# Patient Record
Sex: MALE | Race: BLACK OR AFRICAN AMERICAN
[De-identification: names, ages, dates, MRNs, and addresses within clinical notes are randomized per-mention and may not be internally consistent; named-entity substitution may affect disease eponyms.]

---

## 2017-03-27 NOTE — EDM.PDOC
ED HPI GENERAL MEDICAL PROBLEM





- General


Chief Complaint: General


Stated Complaint: BLEEDING MOUTH


Time Seen by Provider: 03/27/17 21:10


Source of Information: Reports: Patient


History Limitations: Reports: No limitations





- History of Present Illness


INITIAL COMMENTS - FREE TEXT/NARRATIVE: 





History of present illness:


[27-year-old male comes in complaining with acute oral pain indicates that he 

has had 2 stents fractured and his mouth is bleeding. Tooth is an area of 18-19 

and is able to store tooth that has now fractured and you can see the amalgam 

as well as the Bentson and some beginning caries.]





Review of systems: 


As per history of present illness and below otherwise all systems reviewed and 

negative.





Past medical history: 


As per history of present illness and as reviewed below otherwise 

noncontributory.





Surgical history: 


As per history of present illness and as reviewed below otherwise 

noncontributory.





Social history: 


No reported history of drug or alcohol abuse.





Family history: 


As per history of present illness and as reviewed below otherwise 

noncontributory.





Physical exam:


HEENT: Atraumatic, normocephalic, pupils reactive, negative for conjunctival 

pallor or scleral icterus, mucous membranes moist, throat clear, neck supple, 

nontender, trachea midline.


Lungs: Clear to auscultation, breath sounds equal bilaterally, chest nontender.


Heart: S1S2, regular, negative for clicks, rubs, or JVD.


Abdomen: Soft, nondistended, nontender. Negative for masses or 

hepatosplenomegaly. Negative for costovertebral tenderness.


Pelvis: Stable nontender.


Genitourinary: Deferred.


Rectal: Deferred.


Extremities: Atraumatic, negative for cords or calf pain. Neurovascular 

unremarkable.


Neuro: Awake, alert, oriented. Cranial nerves II through XII unremarkable. 

Cerebellum unremarkable. Motor and sensory unremarkable throughout. Exam 

nonfocal.





Fractured tooth with obvious piece of tooth missing with dental caries





Diagnostics:


[]





Therapeutics:


[]





Impression: 


[Oral pain]





Plan:


[Dental balls, antibiotics, brief run of Tylenol 3]





Definitive disposition and diagnosis as appropriate pending reevaluation and 

review of above.








  ** left lower jaw


Pain Score (Numeric/FACES): 8





- Related Data


 Allergies











Allergy/AdvReac Type Severity Reaction Status Date / Time


 


seafood Allergy  Airway Uncoded 03/27/17 21:08





   Tightness  











Home Meds: 


 Home Meds





Amoxicillin/Clavulanate K [Augmentin 875 MG/125 MG] 1 tab PO Q12HR #20 tablet 03 /27/17 [Rx]











Past Medical History





- Past Health History


Medical/Surgical History: Denies Medical/Surgical History


Other Cardiovascular History: childhood heart murmur





- Infectious Disease History


Infectious Disease History: Reports: Chicken pox





Social & Family History





- Family History


Family Medical History: Noncontributory





- Tobacco Use


Smoking Status *Q: Current Every Day Smoker


Years of Tobacco use: 3


Packs/Tins Daily: 0.5


Used Tobacco, but Quit: Yes


Month Tobacco Last Used: 11/01/2015





- Recreational Drug Use


Recreational Drug Use: Yes


Drug Use in Last 12 Months: Yes


Recreational Drug Type: Reports: Marijuana/Hashish


Recreational Drug Use Frequency: Binges





ED ROS GENERAL





- Review of Systems


Review Of Systems: See Below (The history of present illness)





ED EXAM, GENERAL





- Physical Exam


Exam: See Below (See history of present illness)





Course





- Vital Signs


Last Recorded V/S: 





 Last Vital Signs











Temp  36.4 C   03/27/17 21:08


 


Pulse  88   03/27/17 21:08


 


Resp  18   03/27/17 21:08


 


BP  134/74   03/27/17 21:08


 


Pulse Ox  98   03/27/17 21:08














- Orders/Labs/Meds


Meds: 





Medications














Discontinued Medications














Generic Name Dose Route Start Last Admin





  Trade Name Wong  PRN Reason Stop Dose Admin


 


Benzocaine  2 each  03/27/17 21:17  





  Hurricaine One 20%  MUCMEM  03/27/17 21:18  





  ONETIME ONE   


 


Lidocaine HCl  15 ml  03/27/17 21:17  





  Xylocaine 2% Viscous  PO  03/27/17 21:18  





  ONETIME ONE   














Departure





- Departure


Time of Disposition: 21:21


Disposition: Home, Self-Care 01


Condition: good


Clinical Impression: 


 Dental abscess, Dental caries, Fractured dental restoration with loss of 

material





Prescriptions: 


Amoxicillin/Clavulanate K [Augmentin 875 MG/125 MG] 1 tab PO Q12HR #20 tablet


Forms:  ED Department Discharge


Additional Instructions: 


The following information is given to patients seen in the emergency department 

who are being discharged to home. This information is to outline your options 

for follow-up care. We provide all patients seen in our emergency department 

with a follow-up referral.





The need for follow-up, as well as the timing and circumstances, are variable 

depending upon the specifics of your emergency department visit.





If you don't have a primary care physician on staff, we will provide you with a 

referral. We always advise you to contact your personal physician following an 

emergency department visit to inform them of the circumstance of the visit and 

for follow-up with them and/or the need for any referrals to a consulting 

specialist.





The emergency department will also refer you to a specialist when appropriate. 

This referral assures that you have the opportunity for follow-up care with a 

specialist. All of these measure are taken in an effort to provide you with 

optimal care, which includes your follow-up.





Under all circumstances we always encourage you to contact your private 

physician who remains a resource for coordinating your care. When calling for 

follow-up care, please make the office aware that this follow-up is from your 

recent emergency room visit. If for any reason you are refused follow-up, 

please contact the [facility] Emergency 

Department at [phone] and asked to speak to the emergency department 

charge nurse.





Take medication as directed








All the primary care provider/dentist ASAP











Return to ED as needed as discussed

## 2017-05-15 NOTE — EDM.PDOC
ED HPI GENERAL MEDICAL PROBLEM





- General


Chief Complaint: General


Stated Complaint: MOUTH


Time Seen by Provider: 05/15/17 08:38


Source of Information: Reports: Patient


History Limitations: Reports: No Limitations





- History of Present Illness


INITIAL COMMENTS - FREE TEXT/NARRATIVE: 


History of present illness:


[] Patient has history of a bad tooth it intermittently flares up with pain. 

Yesterday it got worse after eating something hard or he denies any fevers, 

chills or swelling of his face. He feels there is swelling of the surrounding 

gum but denies any drainage.





Review of systems: 


As per history of present illness and below otherwise all systems reviewed and 

negative.





Past medical history: 


As per history of present illness and as reviewed below otherwise 

noncontributory.





Surgical history: 


As per history of present illness and as reviewed below otherwise 

noncontributory.





Social history: 


No reported history of drug or alcohol abuse.





Family history: 


As per history of present illness and as reviewed below otherwise 

noncontributory.





Physical exam:


General: Well developed, well nourished in NAD


HEENT: Atraumatic, normocephalic, pupils reactive, negative for conjunctival 

pallor or scleral icterus, mucous membranes moist, throat clear, neck supple, 

nontender, trachea midline.


Lungs: Clear to auscultation, breath sounds equal bilaterally, chest nontender.


Heart: S1S2, regular, negative for clicks, rubs, or JVD.


Abdomen: Soft, nondistended, nontender. Negative for masses or 

hepatosplenomegaly. Negative for costovertebral tenderness.


Pelvis: Stable nontender.


Genitourinary: Deferred.


Rectal: Deferred.


Extremities: Atraumatic, negative for cords or calf pain. Neurovascular 

unremarkable.


Neuro: Awake, alert, oriented. Cranial nerves II through XII unremarkable. 

Cerebellum unremarkable. Motor and sensory unremarkable throughout. Exam 

nonfocal.





Diagnostics:


[]





Therapeutics:


[] I offered a dental block he refused because he is "deathly afraid of needles

". Patient given tramadol in the ED





Impression: 


[] Dental caries





Plan:


[] Lane K, tramadol, dental balls for pain followup with a dentist for tooth 

extraction ASAP





Definitive disposition and diagnosis as appropriate pending reevaluation and 

review of above.





  ** Left Lower Dental


Pain Score (Numeric/FACES): 7





- Related Data


 Allergies











Allergy/AdvReac Type Severity Reaction Status Date / Time


 


seafood Allergy  Airway Uncoded 05/15/17 08:50





   Tightness  











Home Meds: 


 Home Meds





Penicillin V Potassium 500 mg PO Q6HR #40 tab 05/15/17 [Rx]


traMADol [Ultram] 50 mg PO Q8H PRN #12 tablet 05/15/17 [Rx]











Past Medical History





- Past Health History


Medical/Surgical History: Denies Medical/Surgical History


Other Cardiovascular History: childhood heart murmur





- Infectious Disease History


Infectious Disease History: Reports: Chicken Pox





Social & Family History





- Family History


Family Medical History: Noncontributory





- Tobacco Use


Smoking Status *Q: Current Every Day Smoker


Years of Tobacco use: 3


Packs/Tins Daily: 0.5


Used Tobacco, but Quit: Yes


Month Tobacco Last Used: 11/01/2015





- Recreational Drug Use


Recreational Drug Use: Yes


Drug Use in Last 12 Months: Yes


Recreational Drug Type: Reports: Marijuana/Hashish


Recreational Drug Use Frequency: Binges





ED ROS GENERAL





- Review of Systems


Review Of Systems: See Below (See history of present illness)





ED EXAM, GENERAL





- Physical Exam


Exam: See Below (See history of present illness)





Course





- Vital Signs


Last Recorded V/S: 


 Last Vital Signs











Temp  36.7 C   05/15/17 08:50


 


Pulse  82   05/15/17 08:50


 


Resp  18   05/15/17 08:50


 


BP  196/103 H  05/15/17 08:50


 


Pulse Ox  97   05/15/17 08:50














- Orders/Labs/Meds


Meds: 


Medications














Discontinued Medications














Generic Name Dose Route Start Last Admin





  Trade Name Freq  PRN Reason Stop Dose Admin


 


Benzocaine  2 each  05/15/17 08:52  





  Hurricaine One 20%  MUCMEM  05/15/17 08:53  





  ONETIME ONE   


 


Lidocaine HCl  15 ml  05/15/17 08:52  





  Xylocaine 2% Viscous  PO  05/15/17 08:53  





  ONETIME ONE   


 


Tramadol HCl  50 mg  05/15/17 08:55  





  Ultram  PO  05/15/17 08:56  





  ONETIME ONE   














Departure





- Departure


Time of Disposition: 08:58


Disposition: Home, Self-Care 01


Condition: good


Clinical Impression: 


 Dental caries








- Discharge Information


Prescriptions: 


Penicillin V Potassium 500 mg PO Q6HR #40 tab


traMADol [Ultram] 50 mg PO Q8H PRN #12 tablet


 PRN Reason: Pain


Forms:  ED Department Discharge


Additional Instructions: 


The following information is given to patients seen in the emergency department 

who are being discharged to home. This information is to outline your options 

for follow-up care. We provide all patients seen in our emergency department 

with a follow-up referral.





The need for follow-up, as well as the timing and circumstances, are variable 

depending upon the specifics of your emergency department visit.





If you don't have a primary care physician on staff, we will provide you with a 

referral. We always advise you to contact your personal physician following an 

emergency department visit to inform them of the circumstance of the visit and 

for follow-up with them and/or the need for any referrals to a consulting 

specialist.





The emergency department will also refer you to a specialist when appropriate. 

This referral assures that you have the opportunity for follow-up care with a 

specialist. All of these measure are taken in an effort to provide you with 

optimal care, which includes your follow-up.





Under all circumstances we always encourage you to contact your private 

physician who remains a resource for coordinating your care. When calling for 

follow-up care, please make the office aware that this follow-up is from your 

recent emergency room visit. If for any reason you are refused follow-up, 

please contact the [facility] Emergency 

Department at [phone] and asked to speak to the emergency department 

charge nurse.








Tramadol, dental pulse, Pen-Vee K followup with a dentist as soon as possible 

for evaluation or establish primary care for pain management the





[facility]


Primary Care


[address] [place] [id number]


Phone: [phone]


Fax: [phone]

## 2018-02-23 ENCOUNTER — HOSPITAL ENCOUNTER (EMERGENCY)
Dept: HOSPITAL 56 - MW.ED | Age: 28
Discharge: HOME | End: 2018-02-23
Payer: SELF-PAY

## 2018-02-23 VITALS — DIASTOLIC BLOOD PRESSURE: 102 MMHG | SYSTOLIC BLOOD PRESSURE: 166 MMHG

## 2018-02-23 DIAGNOSIS — F17.210: ICD-10-CM

## 2018-02-23 DIAGNOSIS — K04.7: Primary | ICD-10-CM

## 2018-02-23 DIAGNOSIS — Z91.013: ICD-10-CM

## 2018-02-23 PROCEDURE — 99282 EMERGENCY DEPT VISIT SF MDM: CPT

## 2018-02-23 NOTE — EDM.PDOC
ED HPI GENERAL MEDICAL PROBLEM





- General


Chief Complaint: ENT Problem


Stated Complaint: ABSCESS TOOTH


Time Seen by Provider: 02/23/18 12:01


Source of Information: Reports: Patient


History Limitations: Reports: No Limitations





- History of Present Illness


INITIAL COMMENTS - FREE TEXT/NARRATIVE: 





HISTORY AND PHYSICAL:


[]28-year-old male presenting with tooth abscess now the tooth has come out





History of Present Illness:


[]Pain since yesterday when tooth "fell out"





Review of Systems:


As per history of present illness and below otherwise all 


systems reviewed and negative.  





Past medical history:


As per history of present illness and as reviewed below


otherwise noncontributory.





Surgical history:


As per history of present illness and as reviewed below


otherwise noncontributory.





Social history:


No reported history of drug or alcohol abuse.





Family history:


As per history of present illness and as reviewed below


otherwise noncontributory.





Physical exam:


Alert and oriented male who is answering questions appropriately in full 

sentences without any shortness of breath is good affect and good eye contact


HEENT: Atraumatic, normocehpalic, pupils reactive, negative for conjunctival 

pallor or scleral icterus, mucous membranes moist, throat clear, neck supple, 

nontender, trachea midline.  Left side of jaw line 2 teeth from the back there 

is a tooth missing and a opening to the side of his jaw


Lungs: Clear to auscultation, breath sounds equal bilaterally, chest non 

tender.  


Heart: S1S2, regular, negative for clicks, rubs, or JVD.


Abdomen: Soft, nondistended, nontender.  Negative for masses or 

hepatossplenmegaly. Negative for costovertebral tenderness.


Pelvis: Stable nontender.


Genitourinary: Deferred.


Rectal: Deferred


Extremities: Atraumatic, negative for cords or calf pain.  


Neurovascular unremarkable.


Neuro:  Awake, alert, oriented.  Cranial nerves II through XII


unremarkable.  Cerebellum unremarkable.  Motor and sensory unremarkable 

throughout.  Exam nonfocal.  





Diagnostics:


[]





Therapeutics:


[dental balls]





Impression:


[Dental abscess]





Plan:


[]Antibiotics Augmentin 875 twice a day 10 days


Use the dental balls


Follow-up with a dentist of your choice





Definitive disposition and diagnosis as appropriate pending


reevaluation and review of above.  





Onset: Sudden


Duration: Day(s):


  ** Left Lower Tooth/Teeth


Pain Score (Numeric/FACES): 3





- Related Data


 Allergies











Allergy/AdvReac Type Severity Reaction Status Date / Time


 


seafood Allergy  Airway Uncoded 02/23/18 11:26





   Tightness  











Home Meds: 


 Home Meds





Amoxicillin/Potassium Clav [Augmentin 875-125 Tablet] 1 each PO Q12HR #20 

tablet 02/23/18 [Rx]











Past Medical History





- Past Health History


Medical/Surgical History: Denies Medical/Surgical History


Other Cardiovascular History: childhood heart murmur





- Infectious Disease History


Infectious Disease History: Reports: Chicken Pox, Other (See Below)


Other Infectious Disease History: impetigo





Social & Family History





- Family History


Family Medical History: Noncontributory





- Tobacco Use


Smoking Status *Q: Current Every Day Smoker


Years of Tobacco use: 2


Packs/Tins Daily: 0.1


Used Tobacco, but Quit: Yes


Month Tobacco Last Used: 11/01/2015





- Recreational Drug Use


Recreational Drug Use: Yes


Drug Use in Last 12 Months: No


Recreational Drug Type: Reports: Marijuana/Hashish


Recreational Drug Use Frequency: Binges





ED ROS ENT





- Review of Systems


Review Of Systems: ROS reveals no pertinent complaints other than HPI.





ED EXAM, ENT





- Physical Exam


Exam: See Below (see dictation)





Course





- Vital Signs


Last Recorded V/S: 





 Last Vital Signs











Temp  36.2 C   02/23/18 11:24


 


Pulse  67   02/23/18 11:24


 


Resp  18   02/23/18 11:24


 


BP  166/102 H  02/23/18 11:24


 


Pulse Ox  97   02/23/18 11:24














Departure





- Departure


Time of Disposition: 12:04


Disposition: Home, Self-Care 01


Condition: Good


Clinical Impression: 


 Dental abscess








- Discharge Information


Prescriptions: 


Amoxicillin/Potassium Clav [Augmentin 875-125 Tablet] 1 each PO Q12HR #20 tablet


Referrals: 


PCP,None [Primary Care Provider] - 


Additional Instructions: 


The following information is given to patients seen in the emergency department 

who are being discharged to home. This information is to outline your options 

for follow-up care. We provide all patients seen in our emergency department 

with a follow-up referral.





The need for follow-up, as well as the timing and circumstances, are variable 

depending upon the specifics of your emergency department visit.





If you don't have a primary care physician on staff, we will provide you with a 

referral. We always advise you to contact your personal physician following an 

emergency department visit to inform them of the circumstance of the visit and 

for follow-up with them and/or the need for any referrals to a consulting 

specialist.





The emergency department will also refer you to a specialist when appropriate. 

This referral assures that you have the opportunity for followup care with a 

specialist. All of these measure are taken in an effort to provide you with 

optimal care, which includes your followup.





Under all circumstances we always encourage you to contact your private 

physician who remains a resource for coordinating  your care. When calling for 

followup care, please make the office aware that this follow-up is from your 

recent emergency room visit. If for any reason you are refused follow-up, 

please contact the [facility] emergency department at [phone] 

and asked to speak to the emergency department charge nurse.





Follow-up with dentist of your choice


Antibiotic is been sent to your pharmacy


Dental balls for discomfort as directed

## 2019-09-30 ENCOUNTER — HOSPITAL ENCOUNTER (EMERGENCY)
Dept: HOSPITAL 56 - MW.ED | Age: 29
Discharge: HOME | End: 2019-09-30
Payer: SELF-PAY

## 2019-09-30 VITALS — HEART RATE: 73 BPM | DIASTOLIC BLOOD PRESSURE: 74 MMHG | SYSTOLIC BLOOD PRESSURE: 119 MMHG

## 2019-09-30 DIAGNOSIS — S82.392A: Primary | ICD-10-CM
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
## 2019-09-30 DIAGNOSIS — Z91.013: ICD-10-CM

## 2019-09-30 NOTE — CR
INDICATION:



Pain after sports injury 



COMPARISON:



None available. 



FINDINGS:



AP, lateral and oblique views of the left ankle were obtained for a total 

of three views. 



There is an acute, nondisplaced vertical fracture of the posterior distal 

tibia. Interestingly, there is no sign of any accompanying fracture of the 

medial or lateral malleoli. 



There is no sign of additional fracture or dislocation. The ankle mortise 

is intact. The talar dome is intact. 



There is a moderate ankle joint effusion. 



There is mild anterior soft tissue swelling. 



No degenerative changes are seen. 



IMPRESSION:



Acute, nondisplaced vertical fracture of the posterior distal tibia.



No sign of any other fracture elsewhere in the ankle with preservation of 

the relationships of the ankle mortise.



Moderate ankle joint effusion. 



Mild anterior soft tissue swelling.



Dictated by Jatinder Barker MD @ Sep 30 2019  8:36PM



Signed by Dr. Jatinder Barker @ Sep 30 2019  8:37PM

## 2019-09-30 NOTE — EDM.PDOC
ED HPI GENERAL MEDICAL PROBLEM





- General


Chief Complaint: Lower Extremity Injury/Pain


Stated Complaint: PT HURT LT ANKLE


Time Seen by Provider: 09/30/19 19:59





- History of Present Illness


INITIAL COMMENTS - FREE TEXT/NARRATIVE: 


HISTORY AND PHYSICAL:





History of present illness:


Patient 29-year-old black male who presents to weeks status post left ankle 

injury with continued pain and swelling no other trauma concern and no other 

complaints





Review of systems: 


As per history of present illness and below otherwise all systems reviewed and 

negative.





Past medical history: 


As per history of present illness and as reviewed below otherwise 

noncontributory.





Surgical history: 


As per history of present illness and as reviewed below otherwise 

noncontributory.





Social history: 


No reported history of drug or alcohol abuse.





Family history: 


As per history of present illness and as reviewed below otherwise 

noncontributory.





Physical exam:


HEENT: Atraumatic, normocephalic, pupils reactive, negative for conjunctival 

pallor or scleral icterus, mucous membranes moist, throat clear, neck supple, 

nontender, trachea midline.


Lungs: Clear to auscultation, breath sounds equal bilaterally, chest nontender.


Heart: S1S2, regular, negative for clicks, rubs, or JVD.


Abdomen: Soft, nondistended, nontender. Negative for masses or 

hepatosplenomegaly. Negative for costovertebral tenderness.


Pelvis: Stable nontender.


Genitourinary: Deferred.


Rectal: Deferred.


Extremities: Patient has tenderness and swelling in region of the lateral 

malleolus of his left ankle neurovascular CMS unremarkable Achilles tendon is 

intact there is no proximal fibular tenderness.


Neuro: Awake, alert, oriented. Cranial nerves II through XII unremarkable. 

Cerebellum unremarkable. Motor and sensory unremarkable throughout. Exam 

nonfocal.





Diagnostics:


X-ray left ankle





Therapeutics:


Posterior mold crutches


Impression: 


#1 left ankle pain 2 weeks status post injury





Definitive disposition and diagnosis as appropriate pending reevaluation and 

review of above.





  ** left ankle


Pain Score (Numeric/FACES): 7





- Related Data


 Allergies











Allergy/AdvReac Type Severity Reaction Status Date / Time


 


seafood Allergy  Airway Uncoded 07/25/18 17:59





   Tightness  











Home Meds: 


 Home Meds





. [No Known Home Meds]  09/30/19 [History]











Past Medical History





- Past Health History


Medical/Surgical History: Denies Medical/Surgical History


Other Cardiovascular History: childhood heart murmur





- Infectious Disease History


Infectious Disease History: Reports: Chicken Pox


Other Infectious Disease History: impetigo





Social & Family History





- Family History


Family Medical History: Noncontributory





- Caffeine Use


Caffeine Use: Reports: None





Review of Systems





- Review of Systems


Review Of Systems: ROS reveals no pertinent complaints other than HPI.





ED EXAM, GENERAL





- Physical Exam


Exam: See Below (Dictation)





Course





- Vital Signs


Last Recorded V/S: 


 Last Vital Signs











Temp  35.9 C   09/30/19 20:02


 


Pulse  76   09/30/19 20:02


 


Resp  14   09/30/19 20:02


 


BP  160/88 H  09/30/19 20:02


 


Pulse Ox  99   09/30/19 20:02














Departure





- Departure


Time of Disposition: 20:55


Disposition: Home, Self-Care 01


Condition: Good


Clinical Impression: 


 Fractured tibia








- Discharge Information


Forms:  ED Department Discharge


Additional Instructions: 


The following information is given to patients seen in the emergency department 

who are being discharged to home. This information is to outline your options 

for follow-up care. We provide all patients seen in our emergency department 

with a follow-up referral.





The need for follow-up, as well as the timing and circumstances, are variable 

depending upon the specifics of your emergency department visit.





If you don't have a primary care physician on staff, we will provide you with a 

referral. We always advise you to contact your personal physician following an 

emergency department visit to inform them of the circumstance of the visit and 

for follow-up with them and/or the need for any referrals to a consulting 

specialist.





The emergency department will also refer you to a specialist when appropriate. 

This referral assures that you have the opportunity for followup care with a 

specialist. All of these measure are taken in an effort to provide you with 

optimal care, which includes your followup.





Under all circumstances we always encourage you to contact your private 

physician who remains a resource for coordinating  your care. When calling for 

followup care, please make the office aware that this follow-up is from your 

recent emergency room visit. If for any reason you are refused follow-up, 

please contact the [facility] emergency department at [phone] 

and asked to speak to the emergency department charge nurse.

















[facility]


Specialty Care - Orthopedic Clinic


[address]


[address] [place] [address], [address]


[place] [address]


Phone: [phone]


Fax: [phone]











Posterior mold crutches as directed Motrin/Tylenol as directed follow-up 

orthopedic surgery as referred return as needed as discussed

## 2019-11-23 ENCOUNTER — HOSPITAL ENCOUNTER (EMERGENCY)
Dept: HOSPITAL 56 - MW.ED | Age: 29
Discharge: HOME | End: 2019-11-23
Payer: SELF-PAY

## 2019-11-23 VITALS — SYSTOLIC BLOOD PRESSURE: 158 MMHG | DIASTOLIC BLOOD PRESSURE: 76 MMHG | HEART RATE: 71 BPM
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
## 2019-11-23 DIAGNOSIS — S99.912A: Primary | ICD-10-CM

## 2019-11-23 DIAGNOSIS — Z91.013: ICD-10-CM

## 2019-11-23 DIAGNOSIS — F17.210: ICD-10-CM

## 2019-11-23 NOTE — EDM.PDOC
ED HPI GENERAL MEDICAL PROBLEM





- General


Chief Complaint: Lower Extremity Injury/Pain


Stated Complaint: SPRAINED LEFT ANKLE


Time Seen by Provider: 11/23/19 18:24


Source of Information: Reports: Patient





- History of Present Illness


INITIAL COMMENTS - FREE TEXT/NARRATIVE: 





HISTORY AND PHYSICAL:


History of present illness:


[JOSÉ MIGUEL shunt presents with history of tibial fracture, his initial visit he had 

waited a couple of weeks to Me and as he thought it had a sprained ankle 

however there was found to be a fracture is placed in a CAM boot due to the 

length of time of the fracture is recently taken a CAM boot off he was running 

up the steps and again felt pain


X-ray does not reveal any nerupture with callus formation remains visible we 

will follow radiology interpretation however at this time CAM boot crutches 

nonweightbearing


Follow-up with orthopedist





No other injury no fever nausea vomiting chills sweats








Review of systems: 


As per history of present illness and below otherwise all systems reviewed and 

negative.


Past medical history: 


As per history of present illness and as reviewed below otherwise 

noncontributory.


Surgical history: 


As per history of present illness and as reviewed below otherwise 

noncontributory.


Social history: 


No reported history of drug or alcohol abuse.


Family history: 


As per history of present illness and as reviewed below otherwise 

noncontributory.


Physical exam:


HEENT: Atraumatic, normocephalic, pupils reactive, negative for conjunctival 

pallor or scleral icterus, mucous membranes moist, throat clear, neck supple, 

nontender, trachea midline.


Lungs: Clear to auscultation, breath sounds equal bilaterally, chest nontender.


Heart: S1S2, regular, negative for clicks, rubs, or JVD.


Abdomen: Soft, nondistended, nontender. Negative for masses or 

hepatosplenomegaly. Negative for costovertebral tenderness.


Pelvis: Stable nontender.


Genitourinary: Deferred.


Rectal: Deferred.


Extremities: Atraumatic, negative for cords or calf pain. Neurovascular 

unremarkable.


Neuro: Awake, alert, oriented. Cranial nerves II through XII unremarkable. 

Cerebellum unremarkable. Motor and sensory unremarkable throughout. Exam 

nonfocal.


Diagnostics:


[ ankle 3 views


]


Therapeutics:


[ M boot crutches nonweightbearing


Follow-up with orthopedist


]


Impression: 


[ left ankle injury ]


Definitive disposition and diagnosis as appropriate pending reevaluation and 

review of above.


  ** left ankle


Pain Score (Numeric/FACES): 6





- Related Data


 Allergies











Allergy/AdvReac Type Severity Reaction Status Date / Time


 


seafood Allergy  Airway Uncoded 11/23/19 17:53





   Tightness  











Home Meds: 


 Home Meds





. [No Known Home Meds]  09/30/19 [History]











Past Medical History





- Past Health History


Medical/Surgical History: Denies Medical/Surgical History


Other Cardiovascular History: childhood heart murmur





- Infectious Disease History


Infectious Disease History: Reports: Chicken Pox


Other Infectious Disease History: impetigo





Social & Family History





- Family History


Family Medical History: Noncontributory





- Tobacco Use


Smoking Status *Q: Current Every Day Smoker


Years of Tobacco use: 7


Packs/Tins Daily: 0.5





- Caffeine Use


Caffeine Use: Reports: None





- Recreational Drug Use


Recreational Drug Use: No





Review of Systems





- Review of Systems


Review Of Systems: See Below





ED EXAM, GENERAL





- Physical Exam


Exam: See Below





Course





- Vital Signs


Last Recorded V/S: 





 Last Vital Signs











Temp  97.6 F   11/23/19 17:51


 


Pulse  65   11/23/19 17:51


 


Resp  18   11/23/19 17:51


 


BP  169/101 H  11/23/19 17:51


 


Pulse Ox  99   11/23/19 17:51














- Orders/Labs/Meds


Orders: 





 Active Orders 24 hr











 Category Date Time Status


 


 Ankle Min 3V Lt [CR] Stat Exams  11/23/19 17:43 Taken














Departure





- Departure


Time of Disposition: 18:25


Disposition: Home, Self-Care 01


Condition: Good


Clinical Impression: 


 Left ankle injury








- Discharge Information


Referrals: 


PCP,None [Primary Care Provider] - 


Additional Instructions: 


Continue CAM boot crutches nonweightbearing


Return if symptoms persist or worsen


Follow-up with orthopedist call phone number below to schedule appropriate 

follow-up





[facility] - [facility]


[address], [address]


[place] [address]


Phone: [phone]


Fax: [phone] my orthopedic





The following information is given to patients seen in the emergency department 

who are being discharged to home. This information is to outline your options 

for follow-up care. We provide all patients seen in our emergency department 

with a follow-up referral.





The need for follow-up, as well as the timing and circumstances, are variable 

depending upon the specifics of your emergency department visit.





If you don't have a primary care physician on staff, we will provide you with a 

referral. We always advise you to contact your personal physician following an 

emergency department visit to inform them of the circumstance of the visit and 

for follow-up with them and/or the need for any referrals to a consulting 

specialist.





The emergency department will also refer you to a specialist when appropriate. 

This referral assures that you have the opportunity for follow-up care with a 

specialist. All of these measure are taken in an effort to provide you with 

optimal care, which includes your follow-up.





Under all circumstances we always encourage you to contact your private 

physician who remains a resource for coordinating your care. When calling for 

follow-up care, please make the office aware that this follow-up is from your 

recent emergency room visit. If for any reason you are refused follow-up, 

please contact the [facility] emergency department at [phone] 

and asked to speak to the emergency department charge nurse.








- My Orders


Last 24 Hours: 





My Active Orders





11/23/19 17:43


Ankle Min 3V Lt [CR] Stat 














- Assessment/Plan


Last 24 Hours: 





My Active Orders





11/23/19 17:43


Ankle Min 3V Lt [CR] Stat

## 2019-11-23 NOTE — CR
HISTORY:



Posterior tibial fracture. Pain.



TECHNIQUE:



Three views of the left ankle.



COMPARISON:



09/30/2019.



FINDINGS:



Persistent visualization of a portion of the posterior distal tibial 

fracture line with callus formation present about the fracture. Fracture 

does not appear completely healed as of yet. There is no new fracture. No 

widening of the ankle mortise. No significant degenerative change. No 

radiopaque foreign body or soft tissue gas.



IMPRESSION:



1. As of yet incomplete healing of the posterior distal tibial fracture. 

Persistent visualization of a portion of the fracture line with callus 

formation.



2. No new fracture.



3. No widening of the ankle mortise.



Dictated by Rc Lake MD @ 11/23/2019 6:28:37 PM



Dictated by: Rc Lake MD @ 11/23/2019 18:28:47



(Electronically Signed)
none

## 2023-08-10 ENCOUNTER — HOSPITAL ENCOUNTER (EMERGENCY)
Dept: HOSPITAL 56 - MW.ED | Age: 33
Discharge: HOME | End: 2023-08-10
Payer: SELF-PAY

## 2023-08-10 VITALS — SYSTOLIC BLOOD PRESSURE: 185 MMHG | DIASTOLIC BLOOD PRESSURE: 109 MMHG | HEART RATE: 74 BPM

## 2023-08-10 DIAGNOSIS — J02.9: Primary | ICD-10-CM

## 2023-08-10 DIAGNOSIS — Z91.013: ICD-10-CM
